# Patient Record
Sex: MALE | Race: WHITE | NOT HISPANIC OR LATINO | Employment: OTHER | URBAN - METROPOLITAN AREA
[De-identification: names, ages, dates, MRNs, and addresses within clinical notes are randomized per-mention and may not be internally consistent; named-entity substitution may affect disease eponyms.]

---

## 2023-11-06 DIAGNOSIS — E78.5 HYPERLIPIDEMIA: Primary | ICD-10-CM

## 2023-11-06 RX ORDER — ATORVASTATIN CALCIUM 40 MG/1
40 TABLET, FILM COATED ORAL DAILY
COMMUNITY
End: 2023-11-06 | Stop reason: SDUPTHER

## 2023-11-06 RX ORDER — ATORVASTATIN CALCIUM 40 MG/1
40 TABLET, FILM COATED ORAL DAILY
Qty: 90 TABLET | Refills: 3 | Status: SHIPPED | OUTPATIENT
Start: 2023-11-06 | End: 2024-11-05

## 2023-11-21 DIAGNOSIS — I11.0 HYPERTENSIVE HEART DISEASE WITH HEART FAILURE (MULTI): Primary | ICD-10-CM

## 2023-11-21 RX ORDER — LISINOPRIL 20 MG/1
20 TABLET ORAL DAILY
COMMUNITY
End: 2023-11-21 | Stop reason: SDUPTHER

## 2023-11-21 RX ORDER — LISINOPRIL 20 MG/1
20 TABLET ORAL DAILY
Qty: 90 TABLET | Refills: 3 | Status: SHIPPED | OUTPATIENT
Start: 2023-11-21 | End: 2024-11-20

## 2024-01-03 ENCOUNTER — APPOINTMENT (OUTPATIENT)
Dept: CARDIOLOGY | Facility: CLINIC | Age: 79
End: 2024-01-03
Payer: MEDICARE

## 2024-01-03 ENCOUNTER — OFFICE VISIT (OUTPATIENT)
Dept: CARDIOLOGY | Facility: CLINIC | Age: 79
End: 2024-01-03
Payer: MEDICARE

## 2024-01-03 VITALS
OXYGEN SATURATION: 97 % | WEIGHT: 243.4 LBS | HEIGHT: 70 IN | BODY MASS INDEX: 34.84 KG/M2 | SYSTOLIC BLOOD PRESSURE: 115 MMHG | DIASTOLIC BLOOD PRESSURE: 74 MMHG | HEART RATE: 88 BPM

## 2024-01-03 DIAGNOSIS — I25.10 CORONARY ARTERY DISEASE INVOLVING NATIVE CORONARY ARTERY OF NATIVE HEART WITHOUT ANGINA PECTORIS: Primary | ICD-10-CM

## 2024-01-03 PROCEDURE — 1160F RVW MEDS BY RX/DR IN RCRD: CPT | Performed by: NURSE PRACTITIONER

## 2024-01-03 PROCEDURE — 1159F MED LIST DOCD IN RCRD: CPT | Performed by: NURSE PRACTITIONER

## 2024-01-03 PROCEDURE — 99214 OFFICE O/P EST MOD 30 MIN: CPT | Performed by: NURSE PRACTITIONER

## 2024-01-03 PROCEDURE — 1036F TOBACCO NON-USER: CPT | Performed by: NURSE PRACTITIONER

## 2024-01-03 PROCEDURE — 1126F AMNT PAIN NOTED NONE PRSNT: CPT | Performed by: NURSE PRACTITIONER

## 2024-01-03 RX ORDER — VENLAFAXINE HYDROCHLORIDE 150 MG/1
1 CAPSULE, EXTENDED RELEASE ORAL DAILY
COMMUNITY
Start: 2013-12-12 | End: 2024-03-19

## 2024-01-03 RX ORDER — ASPIRIN 81 MG/1
81 TABLET ORAL DAILY
COMMUNITY

## 2024-01-03 ASSESSMENT — ENCOUNTER SYMPTOMS
LOSS OF SENSATION IN FEET: 0
CARDIOVASCULAR NEGATIVE: 1
CONSTITUTIONAL NEGATIVE: 1
GASTROINTESTINAL NEGATIVE: 1
OCCASIONAL FEELINGS OF UNSTEADINESS: 0
RESPIRATORY NEGATIVE: 1
MUSCULOSKELETAL NEGATIVE: 1
DEPRESSION: 0
NEUROLOGICAL NEGATIVE: 1

## 2024-01-03 ASSESSMENT — PAIN SCALES - GENERAL: PAINLEVEL: 0-NO PAIN

## 2024-01-03 NOTE — PROGRESS NOTES
"Chief Complaint:   Follow-up    History Of Present Illness:    .Mr Hammer returns in follow up.  Denies chest pain, sob, palpitations or pedal edema.           Last Recorded Vitals:  Blood pressure 115/74, pulse 88, height 1.778 m (5' 10\"), weight 110 kg (243 lb 6.4 oz), SpO2 97 %.     Past Medical History:  Past Medical History:   Diagnosis Date    Acute upper respiratory infection, unspecified 01/23/2019    URI with cough and congestion    Chills (without fever) 01/23/2019    Chills    Infection and inflammatory reaction due to other internal prosthetic devices, implants and grafts, initial encounter (CMS/[facility]) 08/14/2018    BAHA cochlear implant bone infection    Other chest pain 01/23/2019    Chest heaviness    Other specified cough 01/23/2019    Productive cough    Personal history of diseases of the blood and blood-forming organs and certain disorders involving the immune mechanism 07/05/2022    History of anemia    Personal history of malignant neoplasm of prostate     History of malignant neoplasm of prostate    Personal history of Methicillin resistant Staphylococcus aureus infection 05/15/2018    History of methicillin resistant Staphylococcus aureus infection    Personal history of other diseases of the respiratory system 03/04/2019    History of acute bronchitis with bronchospasm    Personal history of other endocrine, nutritional and metabolic disease 08/17/2017    History of hyperglycemia    Personal history of other specified conditions 11/13/2017    History of fatigue        Past Surgical History:  Past Surgical History:   Procedure Laterality Date    COLONOSCOPY  12/12/2013    Complete Colonoscopy    CT ABDOMEN PELVIS ANGIOGRAM W AND/OR WO IV CONTRAST  9/13/2022    CT ABDOMEN PELVIS ANGIOGRAM W AND/OR WO IV CONTRAST 9/13/2022 [facility]    SHOULDER SURGERY  03/15/2017    Shoulder Surgery    TOTAL KNEE ARTHROPLASTY  12/12/2013    Total Knee Arthroplasty    TRANSURETHRAL RESECTION OF PROSTATE  " 08/14/2018    Transurethral Resection Of Prostate (TURP)       Social History:  Social History     Socioeconomic History    Marital status:      Spouse name: None    Number of children: None    Years of education: None    Highest education level: None   Occupational History    None   Tobacco Use    Smoking status: Never    Smokeless tobacco: Never   Substance and Sexual Activity    Alcohol use: None    Drug use: None    Sexual activity: None   Other Topics Concern    None   Social History Narrative    None     Social Determinants of Health     Financial Resource Strain: Not on file   Food Insecurity: Not on file   Transportation Needs: Not on file   Physical Activity: Not on file   Stress: Not on file   Social Connections: Not on file   Intimate Partner Violence: Not on file   Housing Stability: Not on file       Family History:  No family history on file.      Allergies:  Patient has no known allergies.    Outpatient Medications:  Current Outpatient Medications   Medication Sig Dispense Refill    aspirin-calcium carbonate 81 mg-300 mg calcium(777 mg) tablet Take 1 tablet by mouth once daily.      atorvastatin (Lipitor) 40 mg tablet Take 1 tablet (40 mg) by mouth once daily. 90 tablet 3    lisinopril 20 mg tablet Take 1 tablet (20 mg) by mouth once daily. 90 tablet 3    multivit-min/ferrous fumarate (MULTI VITAMIN ORAL) Take 1 tablet by mouth once daily.      venlafaxine XR (Effexor-XR) 150 mg 24 hr capsule Take 1 capsule (150 mg) by mouth once daily.       No current facility-administered medications for this visit.        Physical Exam:  Cardiovascular:      PMI at left midclavicular line. Normal rate. Regular rhythm. Normal S1. Normal S2.       Murmurs: There is no murmur.      No gallop.  No click. No rub.   Pulses:     Intact distal pulses.   Edema:     Peripheral edema absent.         ROS:  Review of Systems   Constitutional: Negative.   Cardiovascular: Negative.    Respiratory: Negative.     Skin:  Negative.    Musculoskeletal: Negative.    Gastrointestinal: Negative.    Genitourinary: Negative.    Neurological: Negative.           Last Labs:  CBC -  Lab Results   Component Value Date    WBC 7.0 07/15/2022    HGB 14.4 07/15/2022    HCT 44.2 07/15/2022    MCV 98 07/15/2022     07/15/2022       CMP -  Lab Results   Component Value Date    CALCIUM 9.6 09/07/2022    PHOS 3.7 09/07/2022    PROT 7.5 07/15/2022    ALBUMIN 4.1 09/07/2022    AST 20 07/15/2022    ALT 17 07/15/2022    ALKPHOS 109 07/15/2022    BILITOT 1.0 07/15/2022       LIPID PANEL -   Lab Results   Component Value Date    CHOL 146 07/06/2022    TRIG 87 07/06/2022    HDL 50.7 07/06/2022    CHHDL 2.9 07/06/2022    LDLF 78 07/06/2022    VLDL 17 07/06/2022       RENAL FUNCTION PANEL -   Lab Results   Component Value Date    GLUCOSE 113 (H) 09/07/2022     09/07/2022    K 4.7 09/07/2022     09/07/2022    CO2 29 09/07/2022    ANIONGAP 11 09/07/2022    BUN 15 09/07/2022    CREATININE 0.97 09/07/2022    GFRMALE 81 09/07/2022    CALCIUM 9.6 09/07/2022    PHOS 3.7 09/07/2022    ALBUMIN 4.1 09/07/2022        Lab Results   Component Value Date    HGBA1C 5.8 (H) 12/29/2023         Assessment/Plan   Problem List Items Addressed This Visit    None      Impressions     Assessment:  1.  CAD, diagnosis not confirmed in the past by a negative  stress echo in 01/2010 and again in 3/2017. He is asymptomatic. Will order CT calcium score which was 34 when done in 2018.  2. Positive family history of CAD.  3. Hyperlipidemia, reasonable albeit not ideal response to simvastatin 40  mg daily with a recent lipid panel 11/14/17 including cholesterol 153, LDL 72, HDL  55, triglyceride 130. Will remain on atorvastatin 40 mg daily.  4. Hypertension. Blood pressure under excellent control with the  lisinopril 20 mg daily.  5. History of nephrolithiasis.  6. BPH.  7. 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 Has had several subsequent bilateral shoulder replacements.  8.  History of chronic anxiety and depression.  9. History of prostate carcinoma.  10. MRSA of the right ear. Patient will have surgery August 1, 2018. Now has cochlear implants.    Coty Lopez, EDUARDO-CNP

## 2024-08-26 ENCOUNTER — TELEPHONE (OUTPATIENT)
Dept: CARDIOLOGY | Facility: HOSPITAL | Age: 79
End: 2024-08-26
Payer: MEDICARE

## 2024-08-26 DIAGNOSIS — I11.0 HYPERTENSIVE HEART DISEASE WITH HEART FAILURE (MULTI): Primary | ICD-10-CM

## 2024-08-26 RX ORDER — LISINOPRIL 20 MG/1
20 TABLET ORAL DAILY
Qty: 90 TABLET | Refills: 3 | Status: SHIPPED | OUTPATIENT
Start: 2024-08-26 | End: 2025-08-26

## 2024-09-06 PROBLEM — Z98.890 POST-OPERATIVE STATE: Status: ACTIVE | Noted: 2023-10-25

## 2024-09-06 PROBLEM — M54.2 NECK PAIN: Status: ACTIVE | Noted: 2024-09-06

## 2024-09-06 PROBLEM — N40.0 BENIGN PROSTATE HYPERPLASIA: Status: ACTIVE | Noted: 2024-09-06

## 2024-09-06 PROBLEM — G89.29 CHRONIC LEFT SHOULDER PAIN: Status: ACTIVE | Noted: 2022-12-12

## 2024-09-06 PROBLEM — G47.9 SLEEP DISORDER: Status: ACTIVE | Noted: 2021-09-29

## 2024-09-06 PROBLEM — Z96.611 S/P REVERSE TOTAL SHOULDER ARTHROPLASTY, RIGHT: Status: ACTIVE | Noted: 2022-12-12

## 2024-09-06 PROBLEM — S42.135A: Status: ACTIVE | Noted: 2023-01-09

## 2024-09-06 PROBLEM — Z96.21 STATUS POST PLACEMENT OF BONE ANCHORED HEARING AID (BAHA): Status: ACTIVE | Noted: 2017-03-16

## 2024-09-06 PROBLEM — S62.639A CLOSED FRACTURE OF TUFT OF DISTAL PHALANX OF FINGER: Status: ACTIVE | Noted: 2020-01-28

## 2024-09-06 PROBLEM — M54.9 BACK PAIN: Status: ACTIVE | Noted: 2024-09-06

## 2024-09-06 PROBLEM — I72.8 CELIAC ARTERY ANEURYSM (CMS-HCC): Status: ACTIVE | Noted: 2024-09-06

## 2024-09-06 PROBLEM — G62.9 NEUROPATHY: Status: ACTIVE | Noted: 2024-09-06

## 2024-09-06 PROBLEM — E66.9 OBESITY, CLASS I, BMI 30-34.9: Status: ACTIVE | Noted: 2018-07-02

## 2024-09-06 PROBLEM — R39.89 URINE DISCOLORATION: Status: ACTIVE | Noted: 2024-09-06

## 2024-09-06 PROBLEM — H70.13 CHRONIC MASTOIDITIS OF BOTH SIDES: Status: ACTIVE | Noted: 2018-07-26

## 2024-09-06 PROBLEM — H90.6 MIXED HEARING LOSS, BILATERAL: Status: ACTIVE | Noted: 2017-03-16

## 2024-09-06 PROBLEM — L97.812: Status: ACTIVE | Noted: 2022-11-08

## 2024-09-06 PROBLEM — E78.01 FAMILIAL HYPERCHOLESTEREMIA: Status: ACTIVE | Noted: 2024-09-06

## 2024-09-06 PROBLEM — M62.838 MUSCLE SPASM: Status: ACTIVE | Noted: 2024-09-06

## 2024-09-06 PROBLEM — M25.512 CHRONIC LEFT SHOULDER PAIN: Status: ACTIVE | Noted: 2022-12-12

## 2024-09-06 PROBLEM — G57.11 MERALGIA PARESTHETICA OF RIGHT SIDE: Status: ACTIVE | Noted: 2024-09-06

## 2024-09-06 PROBLEM — R10.9 RIGHT FLANK PAIN: Status: ACTIVE | Noted: 2024-09-06

## 2024-09-06 PROBLEM — E55.9 VITAMIN D DEFICIENCY: Status: ACTIVE | Noted: 2024-09-06

## 2024-09-06 PROBLEM — G45.9 TIA (TRANSIENT ISCHEMIC ATTACK): Status: ACTIVE | Noted: 2024-09-06

## 2024-09-06 PROBLEM — E66.811 OBESITY, CLASS I, BMI 30-34.9: Status: ACTIVE | Noted: 2018-07-02

## 2024-09-06 PROBLEM — R42 VERTIGO: Status: ACTIVE | Noted: 2024-09-06

## 2024-09-06 PROBLEM — F41.1 GAD (GENERALIZED ANXIETY DISORDER): Status: ACTIVE | Noted: 2024-09-06

## 2024-09-06 PROBLEM — F32.0 DEPRESSION, MAJOR, SINGLE EPISODE, MILD (CMS-HCC): Status: ACTIVE | Noted: 2022-01-31

## 2024-09-06 PROBLEM — Z86.14 HISTORY OF METHICILLIN RESISTANT STAPHYLOCOCCUS AUREUS (MRSA): Status: ACTIVE | Noted: 2020-08-06

## 2024-09-06 PROBLEM — S81.812A: Status: ACTIVE | Noted: 2022-11-08

## 2024-09-06 PROBLEM — R73.03 PREDIABETES: Status: ACTIVE | Noted: 2024-09-06

## 2024-09-06 PROBLEM — R73.9 HYPERGLYCEMIA: Status: ACTIVE | Noted: 2024-09-06

## 2024-09-06 PROBLEM — R20.2 PARESTHESIA: Status: ACTIVE | Noted: 2024-09-06

## 2024-09-06 PROBLEM — I65.23 ATHEROSCLEROSIS OF BOTH CAROTID ARTERIES: Status: ACTIVE | Noted: 2024-09-06

## 2024-09-06 PROBLEM — F32.A DEPRESSION: Status: ACTIVE | Noted: 2024-09-06

## 2024-09-06 RX ORDER — IBUPROFEN 800 MG/1
800 TABLET ORAL EVERY 6 HOURS PRN
COMMUNITY
Start: 2024-02-08

## 2024-09-06 RX ORDER — MECLIZINE HCL 12.5 MG 12.5 MG/1
12.5-25 TABLET ORAL EVERY 8 HOURS PRN
COMMUNITY
Start: 2024-07-11

## 2024-09-06 RX ORDER — CHOLECALCIFEROL (VITAMIN D3) 50 MCG
2000 TABLET ORAL
COMMUNITY

## 2024-09-06 RX ORDER — VENLAFAXINE HYDROCHLORIDE 150 MG/1
300 CAPSULE, EXTENDED RELEASE ORAL DAILY
COMMUNITY

## 2024-09-10 ENCOUNTER — APPOINTMENT (OUTPATIENT)
Dept: CARDIOLOGY | Facility: CLINIC | Age: 79
End: 2024-09-10
Payer: MEDICARE

## 2024-09-11 ENCOUNTER — APPOINTMENT (OUTPATIENT)
Dept: CARDIOLOGY | Facility: CLINIC | Age: 79
End: 2024-09-11
Payer: MEDICARE

## 2024-11-18 DIAGNOSIS — E78.5 HYPERLIPIDEMIA: Primary | ICD-10-CM

## 2024-11-18 RX ORDER — ATORVASTATIN CALCIUM 40 MG/1
40 TABLET, FILM COATED ORAL DAILY
Qty: 90 TABLET | Refills: 3 | Status: SHIPPED | OUTPATIENT
Start: 2024-11-18 | End: 2025-11-18

## 2025-01-07 ENCOUNTER — APPOINTMENT (OUTPATIENT)
Dept: CARDIOLOGY | Facility: CLINIC | Age: 80
End: 2025-01-07
Payer: MEDICARE

## 2025-02-26 ENCOUNTER — APPOINTMENT (OUTPATIENT)
Dept: CARDIOLOGY | Facility: CLINIC | Age: 80
End: 2025-02-26
Payer: MEDICARE

## 2025-03-11 ENCOUNTER — APPOINTMENT (OUTPATIENT)
Dept: CARDIOLOGY | Facility: CLINIC | Age: 80
End: 2025-03-11
Payer: COMMERCIAL

## 2025-05-06 ENCOUNTER — OFFICE VISIT (OUTPATIENT)
Dept: CARDIOLOGY | Facility: CLINIC | Age: 80
End: 2025-05-06
Payer: MEDICARE

## 2025-05-06 VITALS
HEIGHT: 71 IN | WEIGHT: 237.3 LBS | OXYGEN SATURATION: 99 % | SYSTOLIC BLOOD PRESSURE: 132 MMHG | DIASTOLIC BLOOD PRESSURE: 83 MMHG | HEART RATE: 72 BPM | BODY MASS INDEX: 33.22 KG/M2

## 2025-05-06 DIAGNOSIS — E78.5 HYPERLIPIDEMIA: ICD-10-CM

## 2025-05-06 DIAGNOSIS — I11.0 HYPERTENSIVE HEART DISEASE WITH HEART FAILURE: ICD-10-CM

## 2025-05-06 PROCEDURE — 1159F MED LIST DOCD IN RCRD: CPT | Performed by: NURSE PRACTITIONER

## 2025-05-06 PROCEDURE — 3075F SYST BP GE 130 - 139MM HG: CPT | Performed by: NURSE PRACTITIONER

## 2025-05-06 PROCEDURE — 1160F RVW MEDS BY RX/DR IN RCRD: CPT | Performed by: NURSE PRACTITIONER

## 2025-05-06 PROCEDURE — G2211 COMPLEX E/M VISIT ADD ON: HCPCS | Performed by: NURSE PRACTITIONER

## 2025-05-06 PROCEDURE — 99214 OFFICE O/P EST MOD 30 MIN: CPT | Performed by: NURSE PRACTITIONER

## 2025-05-06 PROCEDURE — 1126F AMNT PAIN NOTED NONE PRSNT: CPT | Performed by: NURSE PRACTITIONER

## 2025-05-06 PROCEDURE — 3079F DIAST BP 80-89 MM HG: CPT | Performed by: NURSE PRACTITIONER

## 2025-05-06 PROCEDURE — 1036F TOBACCO NON-USER: CPT | Performed by: NURSE PRACTITIONER

## 2025-05-06 RX ORDER — ATORVASTATIN CALCIUM 40 MG/1
40 TABLET, FILM COATED ORAL DAILY
Qty: 90 TABLET | Refills: 3 | Status: SHIPPED | OUTPATIENT
Start: 2025-05-06 | End: 2026-05-06

## 2025-05-06 RX ORDER — LISINOPRIL 20 MG/1
20 TABLET ORAL DAILY
Qty: 90 TABLET | Refills: 3 | Status: SHIPPED | OUTPATIENT
Start: 2025-05-06 | End: 2026-05-06

## 2025-05-06 ASSESSMENT — ENCOUNTER SYMPTOMS
RESPIRATORY NEGATIVE: 1
GASTROINTESTINAL NEGATIVE: 1
CARDIOVASCULAR NEGATIVE: 1
NEUROLOGICAL NEGATIVE: 1
LOSS OF SENSATION IN FEET: 0
DEPRESSION: 0
MUSCULOSKELETAL NEGATIVE: 1
CONSTITUTIONAL NEGATIVE: 1
OCCASIONAL FEELINGS OF UNSTEADINESS: 0

## 2025-05-06 ASSESSMENT — PATIENT HEALTH QUESTIONNAIRE - PHQ9
2. FEELING DOWN, DEPRESSED OR HOPELESS: NOT AT ALL
1. LITTLE INTEREST OR PLEASURE IN DOING THINGS: NOT AT ALL
SUM OF ALL RESPONSES TO PHQ9 QUESTIONS 1 AND 2: 0

## 2025-05-06 ASSESSMENT — PAIN SCALES - GENERAL: PAINLEVEL_OUTOF10: 0-NO PAIN

## 2025-05-06 NOTE — PROGRESS NOTES
"Chief Complaint:   Follow-up htn    History Of Present Illness:    .Mr Hammer returns in follow up.  Denies chest pain, sob, palpitations or pedal edema.                 Last Recorded Vitals:  Blood pressure 132/83, pulse 72, height 1.803 m (5' 11\"), weight 108 kg (237 lb 4.8 oz), SpO2 99%.     Past Medical History:  Medical History[1]     Past Surgical History:  Surgical History[2]    Social History:  Social History[3]    Family History:  Family History[4]      Allergies:  Oxycodone    Outpatient Medications:  Current Medications[5]     Physical Exam:  Cardiovascular:      PMI at left midclavicular line. Normal rate. Regular rhythm. Normal S1. Normal S2.       Murmurs: There is no murmur.      No gallop.  No click. No rub.   Pulses:     Intact distal pulses.   Edema:     Peripheral edema absent.         ROS:  Review of Systems   Constitutional: Negative.   Cardiovascular: Negative.    Respiratory: Negative.     Skin: Negative.    Musculoskeletal: Negative.    Gastrointestinal: Negative.    Genitourinary: Negative.    Neurological: Negative.           Last Labs: reviewed  CBC -  Lab Results   Component Value Date    WBC 7.0 07/15/2022    HGB 14.4 07/15/2022    HCT 44.2 07/15/2022    MCV 98 07/15/2022     07/15/2022       CMP -  Lab Results   Component Value Date    CALCIUM 9.6 09/07/2022    PHOS 3.7 09/07/2022    PROT 7.5 07/15/2022    ALBUMIN 4.1 09/07/2022    AST 20 07/15/2022    ALT 17 07/15/2022    ALKPHOS 109 07/15/2022    BILITOT 1.0 07/15/2022       LIPID PANEL -   Lab Results   Component Value Date    CHOL 146 07/06/2022    TRIG 87 07/06/2022    HDL 50.7 07/06/2022    CHHDL 2.9 07/06/2022    LDLF 78 07/06/2022    VLDL 17 07/06/2022       RENAL FUNCTION PANEL -   Lab Results   Component Value Date    GLUCOSE 113 (H) 09/07/2022     09/07/2022    K 4.7 09/07/2022     09/07/2022    CO2 29 09/07/2022    ANIONGAP 11 09/07/2022    BUN 15 09/07/2022    CREATININE 0.97 09/07/2022    GFRMALE 81 " 09/07/2022    CALCIUM 9.6 09/07/2022    PHOS 3.7 09/07/2022    ALBUMIN 4.1 09/07/2022        Lab Results   Component Value Date    HGBA1C 5.7 (H) 11/06/2024         Assessment/Plan   Problem List Items Addressed This Visit    None    Assessment:  1.  CAD, diagnosis not confirmed in the past by a negative  stress echo in 01/2010 and again in 3/2017. He is asymptomatic. Will order CT calcium score which was 34 when done in 2018.  2. Positive family history of CAD.  3. Hyperlipidemia, reasonable albeit not ideal response to simvastatin 40  mg daily with a recent lipid panel 11/2024 including cholesterol 143, LDL 68, HDL 59 trig 80.  55, triglyceride 130. Will remain on atorvastatin 40 mg daily.  4. Hypertension. Blood pressure under adequate control with the  lisinopril 20 mg daily.  5. History of nephrolithiasis.  6. BPH.  7. 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 Has had several subsequent bilateral shoulder replacements.  8. History of chronic anxiety and depression.  9. History of prostate carcinoma.  10. MRSA of the right ear. Patient will have surgery August 1, 2018. Now has cochlear implants.         Coty Lopez, APRN-CNP         [1]   Past Medical History:  Diagnosis Date    Acute upper respiratory infection, unspecified 01/23/2019    URI with cough and congestion    Chills (without fever) 01/23/2019    Chills    Infection and inflammatory reaction due to other internal prosthetic devices, implants and grafts, initial encounter 08/14/2018    BAHA cochlear implant bone infection    Other chest pain 01/23/2019    Chest heaviness    Other specified cough 01/23/2019    Productive cough    Personal history of diseases of the blood and blood-forming organs and certain disorders involving the immune mechanism 07/05/2022    History of anemia    Personal history of malignant neoplasm of prostate     History of malignant neoplasm of prostate    Personal history of Methicillin resistant Staphylococcus aureus infection  05/15/2018    History of methicillin resistant Staphylococcus aureus infection    Personal history of other diseases of the respiratory system 03/04/2019    History of acute bronchitis with bronchospasm    Personal history of other endocrine, nutritional and metabolic disease 08/17/2017    History of hyperglycemia    Personal history of other specified conditions 11/13/2017    History of fatigue   [2]   Past Surgical History:  Procedure Laterality Date    COLONOSCOPY  12/12/2013    Complete Colonoscopy    CT ABDOMEN PELVIS ANGIOGRAM W AND/OR WO IV CONTRAST  9/13/2022    CT ABDOMEN PELVIS ANGIOGRAM W AND/OR WO IV CONTRAST 9/13/2022 [facility]    SHOULDER SURGERY  03/15/2017    Shoulder Surgery    TOTAL KNEE ARTHROPLASTY  12/12/2013    Total Knee Arthroplasty    TRANSURETHRAL RESECTION OF PROSTATE  08/14/2018    Transurethral Resection Of Prostate (TURP)   [3]   Social History  Socioeconomic History    Marital status:    Tobacco Use    Smoking status: Never    Smokeless tobacco: Never   Substance and Sexual Activity    Alcohol use: Yes     Comment: every couple months    Drug use: Never     Social Drivers of Health     Food Insecurity: Unknown (12/29/2023)    Received from [facility]     Worried About Running Out of Food in the Last Year: Never true   [4] No family history on file.  [5]   Current Outpatient Medications   Medication Sig Dispense Refill    aspirin 81 mg EC tablet Take 1 tablet (81 mg) by mouth once daily.      atorvastatin (Lipitor) 40 mg tablet Take 1 tablet (40 mg) by mouth once daily. 90 tablet 3    cholecalciferol (Vitamin D-3) 50 MCG (2000 UT) tablet Take 1 tablet (2,000 Units) by mouth once daily.      lisinopril 20 mg tablet Take 1 tablet (20 mg) by mouth once daily. 90 tablet 3    multivit-min/ferrous fumarate (MULTI VITAMIN ORAL) Take 1 tablet by mouth once daily.      venlafaxine XR (Effexor-XR) 150 mg 24 hr capsule Take 1 capsule (150 mg) by mouth  once daily.      ibuprofen 800 mg tablet Take 1 tablet (800 mg) by mouth every 6 hours if needed. (Patient not taking: Reported on 5/6/2025)      meclizine (Antivert) 12.5 mg tablet Take 1-2 tablets (12.5-25 mg) by mouth every 8 hours if needed. (Patient not taking: Reported on 5/6/2025)       No current facility-administered medications for this visit.